# Patient Record
Sex: FEMALE | Race: OTHER | HISPANIC OR LATINO | URBAN - METROPOLITAN AREA
[De-identification: names, ages, dates, MRNs, and addresses within clinical notes are randomized per-mention and may not be internally consistent; named-entity substitution may affect disease eponyms.]

---

## 2022-06-01 ENCOUNTER — EMERGENCY (EMERGENCY)
Facility: HOSPITAL | Age: 37
LOS: 1 days | Discharge: HOME | End: 2022-06-01
Attending: EMERGENCY MEDICINE | Admitting: EMERGENCY MEDICINE
Payer: MEDICAID

## 2022-06-01 VITALS
HEART RATE: 89 BPM | RESPIRATION RATE: 18 BRPM | TEMPERATURE: 97 F | SYSTOLIC BLOOD PRESSURE: 113 MMHG | DIASTOLIC BLOOD PRESSURE: 71 MMHG | OXYGEN SATURATION: 100 %

## 2022-06-01 DIAGNOSIS — Z98.891 HISTORY OF UTERINE SCAR FROM PREVIOUS SURGERY: Chronic | ICD-10-CM

## 2022-06-01 LAB
ALBUMIN SERPL ELPH-MCNC: 4.1 G/DL — SIGNIFICANT CHANGE UP (ref 3.3–5)
ALP SERPL-CCNC: 67 U/L — SIGNIFICANT CHANGE UP (ref 40–120)
ALT FLD-CCNC: 40 U/L — HIGH (ref 4–33)
ANION GAP SERPL CALC-SCNC: 12 MMOL/L — SIGNIFICANT CHANGE UP (ref 7–14)
AST SERPL-CCNC: 29 U/L — SIGNIFICANT CHANGE UP (ref 4–32)
B PERT DNA SPEC QL NAA+PROBE: SIGNIFICANT CHANGE UP
B PERT+PARAPERT DNA PNL SPEC NAA+PROBE: SIGNIFICANT CHANGE UP
BASOPHILS # BLD AUTO: 0.02 K/UL — SIGNIFICANT CHANGE UP (ref 0–0.2)
BASOPHILS NFR BLD AUTO: 0.2 % — SIGNIFICANT CHANGE UP (ref 0–2)
BILIRUB SERPL-MCNC: 0.4 MG/DL — SIGNIFICANT CHANGE UP (ref 0.2–1.2)
BLOOD GAS VENOUS COMPREHENSIVE RESULT: SIGNIFICANT CHANGE UP
BORDETELLA PARAPERTUSSIS (RAPRVP): SIGNIFICANT CHANGE UP
BUN SERPL-MCNC: 4 MG/DL — LOW (ref 7–23)
C PNEUM DNA SPEC QL NAA+PROBE: SIGNIFICANT CHANGE UP
CALCIUM SERPL-MCNC: 9.6 MG/DL — SIGNIFICANT CHANGE UP (ref 8.4–10.5)
CHLORIDE SERPL-SCNC: 97 MMOL/L — LOW (ref 98–107)
CO2 SERPL-SCNC: 23 MMOL/L — SIGNIFICANT CHANGE UP (ref 22–31)
CREAT SERPL-MCNC: 0.42 MG/DL — LOW (ref 0.5–1.3)
EGFR: 132 ML/MIN/1.73M2 — SIGNIFICANT CHANGE UP
EOSINOPHIL # BLD AUTO: 0.12 K/UL — SIGNIFICANT CHANGE UP (ref 0–0.5)
EOSINOPHIL NFR BLD AUTO: 1.4 % — SIGNIFICANT CHANGE UP (ref 0–6)
FLUAV SUBTYP SPEC NAA+PROBE: SIGNIFICANT CHANGE UP
FLUBV RNA SPEC QL NAA+PROBE: SIGNIFICANT CHANGE UP
GLUCOSE SERPL-MCNC: 368 MG/DL — HIGH (ref 70–99)
HADV DNA SPEC QL NAA+PROBE: SIGNIFICANT CHANGE UP
HCOV 229E RNA SPEC QL NAA+PROBE: SIGNIFICANT CHANGE UP
HCOV HKU1 RNA SPEC QL NAA+PROBE: SIGNIFICANT CHANGE UP
HCOV NL63 RNA SPEC QL NAA+PROBE: SIGNIFICANT CHANGE UP
HCOV OC43 RNA SPEC QL NAA+PROBE: SIGNIFICANT CHANGE UP
HCT VFR BLD CALC: 38.9 % — SIGNIFICANT CHANGE UP (ref 34.5–45)
HGB BLD-MCNC: 12.9 G/DL — SIGNIFICANT CHANGE UP (ref 11.5–15.5)
HMPV RNA SPEC QL NAA+PROBE: SIGNIFICANT CHANGE UP
HPIV1 RNA SPEC QL NAA+PROBE: SIGNIFICANT CHANGE UP
HPIV2 RNA SPEC QL NAA+PROBE: SIGNIFICANT CHANGE UP
HPIV3 RNA SPEC QL NAA+PROBE: SIGNIFICANT CHANGE UP
HPIV4 RNA SPEC QL NAA+PROBE: SIGNIFICANT CHANGE UP
IANC: 5.91 K/UL — SIGNIFICANT CHANGE UP (ref 1.8–7.4)
IMM GRANULOCYTES NFR BLD AUTO: 0.2 % — SIGNIFICANT CHANGE UP (ref 0–1.5)
LYMPHOCYTES # BLD AUTO: 2.27 K/UL — SIGNIFICANT CHANGE UP (ref 1–3.3)
LYMPHOCYTES # BLD AUTO: 25.7 % — SIGNIFICANT CHANGE UP (ref 13–44)
M PNEUMO DNA SPEC QL NAA+PROBE: SIGNIFICANT CHANGE UP
MCHC RBC-ENTMCNC: 30 PG — SIGNIFICANT CHANGE UP (ref 27–34)
MCHC RBC-ENTMCNC: 33.2 GM/DL — SIGNIFICANT CHANGE UP (ref 32–36)
MCV RBC AUTO: 90.5 FL — SIGNIFICANT CHANGE UP (ref 80–100)
MONOCYTES # BLD AUTO: 0.48 K/UL — SIGNIFICANT CHANGE UP (ref 0–0.9)
MONOCYTES NFR BLD AUTO: 5.4 % — SIGNIFICANT CHANGE UP (ref 2–14)
NEUTROPHILS # BLD AUTO: 5.91 K/UL — SIGNIFICANT CHANGE UP (ref 1.8–7.4)
NEUTROPHILS NFR BLD AUTO: 67.1 % — SIGNIFICANT CHANGE UP (ref 43–77)
NRBC # BLD: 0 /100 WBCS — SIGNIFICANT CHANGE UP
NRBC # FLD: 0 K/UL — SIGNIFICANT CHANGE UP
PLATELET # BLD AUTO: 304 K/UL — SIGNIFICANT CHANGE UP (ref 150–400)
POTASSIUM SERPL-MCNC: 4.4 MMOL/L — SIGNIFICANT CHANGE UP (ref 3.5–5.3)
POTASSIUM SERPL-SCNC: 4.4 MMOL/L — SIGNIFICANT CHANGE UP (ref 3.5–5.3)
PROT SERPL-MCNC: 7.8 G/DL — SIGNIFICANT CHANGE UP (ref 6–8.3)
RAPID RVP RESULT: DETECTED
RBC # BLD: 4.3 M/UL — SIGNIFICANT CHANGE UP (ref 3.8–5.2)
RBC # FLD: 12.4 % — SIGNIFICANT CHANGE UP (ref 10.3–14.5)
RSV RNA SPEC QL NAA+PROBE: SIGNIFICANT CHANGE UP
RV+EV RNA SPEC QL NAA+PROBE: DETECTED
SARS-COV-2 RNA SPEC QL NAA+PROBE: SIGNIFICANT CHANGE UP
SODIUM SERPL-SCNC: 132 MMOL/L — LOW (ref 135–145)
WBC # BLD: 8.82 K/UL — SIGNIFICANT CHANGE UP (ref 3.8–10.5)
WBC # FLD AUTO: 8.82 K/UL — SIGNIFICANT CHANGE UP (ref 3.8–10.5)

## 2022-06-01 PROCEDURE — 99220: CPT

## 2022-06-01 PROCEDURE — 70481 CT ORBIT/EAR/FOSSA W/DYE: CPT | Mod: 26,MA

## 2022-06-01 RX ORDER — INSULIN LISPRO 100/ML
8 VIAL (ML) SUBCUTANEOUS
Refills: 0 | Status: DISCONTINUED | OUTPATIENT
Start: 2022-06-01 | End: 2022-06-01

## 2022-06-01 RX ORDER — DEXTROSE 50 % IN WATER 50 %
25 SYRINGE (ML) INTRAVENOUS ONCE
Refills: 0 | Status: DISCONTINUED | OUTPATIENT
Start: 2022-06-01 | End: 2022-06-04

## 2022-06-01 RX ORDER — INSULIN LISPRO 100/ML
VIAL (ML) SUBCUTANEOUS
Refills: 0 | Status: DISCONTINUED | OUTPATIENT
Start: 2022-06-01 | End: 2022-06-04

## 2022-06-01 RX ORDER — DEXTROSE 50 % IN WATER 50 %
15 SYRINGE (ML) INTRAVENOUS ONCE
Refills: 0 | Status: DISCONTINUED | OUTPATIENT
Start: 2022-06-01 | End: 2022-06-04

## 2022-06-01 RX ORDER — SODIUM CHLORIDE 9 MG/ML
1000 INJECTION, SOLUTION INTRAVENOUS
Refills: 0 | Status: DISCONTINUED | OUTPATIENT
Start: 2022-06-01 | End: 2022-06-04

## 2022-06-01 RX ORDER — INSULIN LISPRO 100/ML
5 VIAL (ML) SUBCUTANEOUS
Refills: 0 | Status: DISCONTINUED | OUTPATIENT
Start: 2022-06-01 | End: 2022-06-04

## 2022-06-01 RX ORDER — KETOROLAC TROMETHAMINE 30 MG/ML
30 SYRINGE (ML) INJECTION ONCE
Refills: 0 | Status: DISCONTINUED | OUTPATIENT
Start: 2022-06-01 | End: 2022-06-01

## 2022-06-01 RX ORDER — CIPROFLOXACIN AND DEXAMETHASONE 3; 1 MG/ML; MG/ML
4 SUSPENSION/ DROPS AURICULAR (OTIC) ONCE
Refills: 0 | Status: DISCONTINUED | OUTPATIENT
Start: 2022-06-01 | End: 2022-06-01

## 2022-06-01 RX ORDER — INSULIN LISPRO 100/ML
VIAL (ML) SUBCUTANEOUS AT BEDTIME
Refills: 0 | Status: DISCONTINUED | OUTPATIENT
Start: 2022-06-01 | End: 2022-06-04

## 2022-06-01 RX ORDER — GLUCAGON INJECTION, SOLUTION 0.5 MG/.1ML
1 INJECTION, SOLUTION SUBCUTANEOUS ONCE
Refills: 0 | Status: DISCONTINUED | OUTPATIENT
Start: 2022-06-01 | End: 2022-06-04

## 2022-06-01 RX ORDER — DEXTROSE 50 % IN WATER 50 %
12.5 SYRINGE (ML) INTRAVENOUS ONCE
Refills: 0 | Status: DISCONTINUED | OUTPATIENT
Start: 2022-06-01 | End: 2022-06-04

## 2022-06-01 RX ORDER — KETOROLAC TROMETHAMINE 30 MG/ML
15 SYRINGE (ML) INJECTION EVERY 6 HOURS
Refills: 0 | Status: DISCONTINUED | OUTPATIENT
Start: 2022-06-01 | End: 2022-06-01

## 2022-06-01 RX ORDER — CIPROFLOXACIN HCL 0.3 %
4 DROPS OPHTHALMIC (EYE)
Refills: 0 | Status: DISCONTINUED | OUTPATIENT
Start: 2022-06-01 | End: 2022-06-04

## 2022-06-01 RX ORDER — SODIUM CHLORIDE 9 MG/ML
1000 INJECTION INTRAMUSCULAR; INTRAVENOUS; SUBCUTANEOUS ONCE
Refills: 0 | Status: COMPLETED | OUTPATIENT
Start: 2022-06-01 | End: 2022-06-01

## 2022-06-01 RX ORDER — CIPROFLOXACIN HCL 0.3 %
4 DROPS OPHTHALMIC (EYE) ONCE
Refills: 0 | Status: DISCONTINUED | OUTPATIENT
Start: 2022-06-01 | End: 2022-06-01

## 2022-06-01 RX ORDER — INSULIN GLARGINE 100 [IU]/ML
14 INJECTION, SOLUTION SUBCUTANEOUS AT BEDTIME
Refills: 0 | Status: DISCONTINUED | OUTPATIENT
Start: 2022-06-01 | End: 2022-06-04

## 2022-06-01 RX ORDER — CIPROFLOXACIN HCL 0.3 %
4 DROPS OPHTHALMIC (EYE)
Refills: 0 | Status: DISCONTINUED | OUTPATIENT
Start: 2022-06-01 | End: 2022-06-01

## 2022-06-01 RX ADMIN — Medication 15 MILLIGRAM(S): at 22:45

## 2022-06-01 RX ADMIN — SODIUM CHLORIDE 1000 MILLILITER(S): 9 INJECTION INTRAMUSCULAR; INTRAVENOUS; SUBCUTANEOUS at 16:26

## 2022-06-01 RX ADMIN — Medication 4 DROP(S): at 22:27

## 2022-06-01 RX ADMIN — Medication 30 MILLIGRAM(S): at 12:36

## 2022-06-01 RX ADMIN — Medication 15 MILLIGRAM(S): at 22:26

## 2022-06-01 RX ADMIN — Medication 1 TABLET(S): at 20:59

## 2022-06-01 RX ADMIN — Medication 2: at 18:22

## 2022-06-01 RX ADMIN — INSULIN GLARGINE 14 UNIT(S): 100 INJECTION, SOLUTION SUBCUTANEOUS at 22:51

## 2022-06-01 NOTE — ED PROVIDER NOTE - NS ED ATTENDING STATEMENT MOD
This was a shared visit with the BASIL. I reviewed and verified the documentation and independently performed the documented:

## 2022-06-01 NOTE — ED PROVIDER NOTE - OBJECTIVE STATEMENT
37 y/o Telugu speaking F hx type 2 DM here c/o R ear pain x 5 days. Hx obtained using  ID number [id number]. St 37 y/o French speaking F hx type 2 DM here c/o R ear pain x 5 days. Hx obtained using  ID number [id number]. States she went to an outside hospital and was given Amoxicillin, which she has been taking w/o any relief. +diminished hearing in the R ear. Denies fevers, chills, or any other complaints.

## 2022-06-01 NOTE — ED ADULT NURSE NOTE - OBJECTIVE STATEMENT
Pt received to intake AAO x3 c/o ear pain, started 5 days ago right ear pain and jaw pain c/o pain when biting down or eating food. Pt recently seen and dc'd on antibiotics. 18G placed to the left AC and pt medicated for pain.

## 2022-06-01 NOTE — ED PROVIDER NOTE - CLINICAL SUMMARY MEDICAL DECISION MAKING FREE TEXT BOX
37 y/o Nicaraguan speaking F hx type 2 DM here c/o R ear pain x 5 days. Exam concerning for mastoid tenderness. Plan CT IAC, pain control, reassess

## 2022-06-01 NOTE — ED CDU PROVIDER INITIAL DAY NOTE - ATTENDING APP SHARED VISIT CONTRIBUTION OF CARE
Otitis media/externa w/ suspicion for mastoiditis, hyperglycemia-plan abx, pain control, glucose control, ent/endocrine c/s

## 2022-06-01 NOTE — ED ADULT NURSE NOTE - CHIEF COMPLAINT QUOTE
was seen at [facility] d/cd with ABX and pt states" I don't feel better"

## 2022-06-01 NOTE — ED PROVIDER NOTE - CARE PLAN
Principal Discharge DX:	Right ear pain  Secondary Diagnosis:	Hyperglycemia due to diabetes mellitus   1

## 2022-06-01 NOTE — ED PROVIDER NOTE - ATTENDING APP SHARED VISIT CONTRIBUTION OF CARE
36 year old with right ear pain.  concern for otitis externa vs mastoiditis. drainage from ear, suspect tm rupture. abx, ct head ent cx, [facility] cdu for obs

## 2022-06-01 NOTE — ED CDU PROVIDER INITIAL DAY NOTE - OBJECTIVE STATEMENT
37 y/o Greek speaking F hx type 2 DM here c/o R ear pain x 5 days. Hx obtained using  ID number [id number]. States she went to an outside hospital and was given Amoxicillin, which she has been taking w/o any relief. +diminished hearing in the R ear. Denies fevers, chills, or any other complaints.    CDU NOTE: CT IAC done in the main ED w/ acute otitis media and externa, also concerning for possible mastoiditis. ENT c/s called, pending recs. Pt sent to CDU for iv abx and glucose control as glucose is >300. Pt only on Metformin. Was previously on a long-acting insulin 14Uqhs, however was told to discontinue in 11/2021. Endocrine emailed for c/s.

## 2022-06-01 NOTE — CONSULT NOTE ADULT - SUBJECTIVE AND OBJECTIVE BOX
HPI:  Patient is a 36y Female with PMH significant for *** who p/w ***.       Physical Exam  T(C): 36.8 (06-01-22 @ 17:25), Max: 36.8 (06-01-22 @ 13:25)  HR: 84 (06-01-22 @ 17:25) (84 - 89)  BP: 115/80 (06-01-22 @ 17:25) (110/73 - 115/80)  RR: 17 (06-01-22 @ 17:25) (16 - 18)  SpO2: 100% (06-01-22 @ 17:25) (99% - 100%)  General: NAD, A+Ox3  Resp: No respiratory distress, stridor, or stertor  Voice quality: normal  Face:  Symmetric without masses or lesions  OU: EOMI  AD: Pinna wnl, EAC clear, TM intact, no effusion  AS: Pinna wnl, EAC clear, TM intact, no effusion  Nose: nasal cavity clear bilaterally, inferior turbinates normal, mucosa normal without crusting or bleeding  OC/OP: tongue normal, floor of mouth wnl, no masses or lesions, OP clear  Neck: soft/flat, no LAD  CNII-XII intact    **Laryngoscopy Procedure    A/P: 36y Female      --------------------------------------------------------------  Thank you for the consult,    Hetal Byers MD  Resident  Department of Otolaryngology - Head and Neck Surgery  Peds Page #[phone]  Adult Page #[phone]  --------------------------------------------------------------- HPI:  Patient is a 36y Female with PMH significant for type 2 DM here c/o R ear pain x 5 days. Hx obtained using . States she went to an outside hospital and was given Amoxicillin, which she has been taking w/o any relief. +diminished hearing in the R ear. Reports drainage that began about 3 days ago. Denies fevers, chills, or any other complaints. No vertigo or dizziness.     Physical Exam  T(C): 36.8 (06-01-22 @ 17:25), Max: 36.8 (06-01-22 @ 13:25)  HR: 84 (06-01-22 @ 17:25) (84 - 89)  BP: 115/80 (06-01-22 @ 17:25) (110/73 - 115/80)  RR: 17 (06-01-22 @ 17:25) (16 - 18)  SpO2: 100% (06-01-22 @ 17:25) (99% - 100%)  General: NAD, A+Ox3  Resp: No respiratory distress, stridor, or stertor  Voice quality: normal  Face:  Symmetric without masses or lesions  OU: EOMI  AD: Pinna with dried pus, EAC edematous with pus, unable to visualize TM. Wick placed.   AS: Pinna wnl, EAC clear, TM intact with mild tympanosclerosis, no effusion  Nose: nasal cavity clear bilaterally, inferior turbinates normal, mucosa normal without crusting or bleeding  OC/OP: tongue normal, floor of mouth wnl, no masses or lesions, OP clear  Neck: soft/flat, no LAD  CNII-XII intact    c< from: CT Internal Auditory Canals w/ IV Cont (06.01.22 @ 14:20) >  ACC: [id number] EXAM:  CT IAC IC                          PROCEDURE DATE:  06/01/2022          INTERPRETATION:  .    CLINICAL INFORMATION: Right-sided ear pain and jaw pain. Complaining of   pain when biting down or eating food. Evaluate for mastoiditis.    COMPARISON: None available.    TECHNIQUE: High resolution CT imaging of the temporal bones was   performed. 63 cc's of IV Omnipaque-350  was administered without   immediate complication and 7 cc's was discarded. Sagittal and coronal   reformatted images were obtained from the source data and also reviewed.    FINDINGS:    On the right, soft tissue thickening and hyperenhancement involves the   cartilaginous portion of the right external auditory canal. No bony   erosive changes are seen. Surrounding inflammatory changes and soft   tissue thickening is seen involving the periauricular soft tissues   extending to the right retromastoid soft tissues as well.    There is thickening and mild retraction of the tympanic membrane.   Nonspecific abnormal soft tissue is also seen within the middle ear   cavity at the level of the epitympanum surrounding the ossicular chain   and also within Prussak's space. No gross bony erosive changes are   notable. The scutum appears sharp. Nonspecific soft tissue also fills the   hypotympanic recesses. The aditus and mastoid antrum are grossly clear.   There is nonspecific partial opacification of the right mastoid air cells   without mastoid wall destruction. The internal auditory canal is normal   in caliber. The facial nerve has a normal course. The inner ear   structures are normally formed. The cochlea, vestibule, and semicircular   canals appear unremarkable. The vestibular aqueduct appears within normal   limits. The otic capsule appears intact. The bony coverings of the   vascular structures are intact. There is a borderline lateralized right   internal auditory canal. There is a borderline high riding right-sided   jugular bulb.    On the left, the external auditory canal is normal appearance. The   tympanic membrane is not thickened. The middle ear cavity is   well-aerated. The ossicular chain is intact. The otic capsule appears   within normal limits. The facial nerve has a normal course. The inner ear   structures are normally formed. The internal auditory canal is normal in   size. Mastoid air cells are well-aerated. No bony erosion or destruction   is seen. The bony covering of the vascular structures remain intact. The   left internal carotid canal is borderline lateralized. There is a   borderline high riding left-sided jugular bulb.    The visualized portion of the brain is unremarkable.    Scattered mucosal thickening and foamy aerated secretions are seen   throughout the paranasal sinuses.    IMPRESSION:    1. Right-sided otitis externa along with right-sided otitis media.    2. Additional imaging findings for which right-sided mastoiditis can be   considered. No mastoid destruction at this time.    3. Borderline bilateral high riding jugular bulbs.    4. Additional imaging findings for which acute sinusitis can be   considered. Clinical correlation is required for this diagnosis.    --- End of Report ---    < end of copied text >      A/P: 36y Female PMH DM2 p/w R OE and possible OM, unable to visualize TM.   -Wick to stay in 3-4d, can be removed outpt  -ciprodex to R ear  -can switch to augmentin     --------------------------------------------------------------  Thank you for the consult,    Hetal Byers MD  Resident  Department of Otolaryngology - Head and Neck Surgery  Peds Page #[phone]  Adult Page #[phone]  ---------------------------------------------------------------

## 2022-06-01 NOTE — ED ADULT NURSE REASSESSMENT NOTE - NEURO BEHAVIOR
----- Message from Tamiko Gannon sent at 6/4/2020  2:21 PM CDT -----  Contact: Self  Type: Patient Call Back    Who called: Laura    What is the request in detail:Patient called to request an alternative for linaGLIPtin (TRADJENTA) 5 mg for a cheaper price. Please call to advise    Can the clinic reply by [facility]?    Would the patient rather a call back or a response via [facility]? Call      Best call back number:[phone]      
Inform patient we can place her on Amaryl 2 mg 1 tablet daily.  This medication has common side effects of hypoglycemia and possible weight gain  Please verify if she would like to consider a trial of this medication  
Patient picked up partial script for $77. Attempt made to contact patient's reached out to [facility] was on hold for 14 mins.   
Patient would like to proceed with the trial of Amaryl.   
Rxn sent  
calm

## 2022-06-01 NOTE — ED PROVIDER NOTE - PROGRESS NOTE DETAILS
RAMAN Mijares: CT IAC w/ acute otitis media and externa, also concerning for possible mastoiditis. ENT c/s called, pending recs for IV abx. Will put pt in CDU for iv abx and glucose control as glucose is >300. Pt only on Metformin. Was previously on a long-acting insulin 14Uqhs, however was told to discontinue in 11/2021. Endocrine emailed for c/s.

## 2022-06-02 VITALS
HEART RATE: 98 BPM | SYSTOLIC BLOOD PRESSURE: 124 MMHG | DIASTOLIC BLOOD PRESSURE: 73 MMHG | TEMPERATURE: 98 F | OXYGEN SATURATION: 97 % | RESPIRATION RATE: 18 BRPM

## 2022-06-02 DIAGNOSIS — E11.65 TYPE 2 DIABETES MELLITUS WITH HYPERGLYCEMIA: ICD-10-CM

## 2022-06-02 DIAGNOSIS — H60.501 UNSPECIFIED ACUTE NONINFECTIVE OTITIS EXTERNA, RIGHT EAR: ICD-10-CM

## 2022-06-02 DIAGNOSIS — H66.91 OTITIS MEDIA, UNSPECIFIED, RIGHT EAR: ICD-10-CM

## 2022-06-02 PROBLEM — Z00.00 ENCOUNTER FOR PREVENTIVE HEALTH EXAMINATION: Status: ACTIVE | Noted: 2022-06-02

## 2022-06-02 PROCEDURE — 99217: CPT

## 2022-06-02 PROCEDURE — 99234 HOSP IP/OBS SM DT SF/LOW 45: CPT

## 2022-06-02 PROCEDURE — 99204 OFFICE O/P NEW MOD 45 MIN: CPT

## 2022-06-02 RX ORDER — ENOXAPARIN SODIUM 100 MG/ML
14 INJECTION SUBCUTANEOUS
Qty: 15 | Refills: 0
Start: 2022-06-02 | End: 2022-07-31

## 2022-06-02 RX ORDER — ISOPROPYL ALCOHOL, BENZOCAINE .7; .06 ML/ML; ML/ML
1 SWAB TOPICAL
Qty: 100 | Refills: 1
Start: 2022-06-02 | End: 2022-07-21

## 2022-06-02 RX ORDER — METFORMIN HYDROCHLORIDE 850 MG/1
1 TABLET ORAL
Qty: 120 | Refills: 0
Start: 2022-06-02 | End: 2022-07-31

## 2022-06-02 RX ORDER — CIPROFLOXACIN AND DEXAMETHASONE 3; 1 MG/ML; MG/ML
4 SUSPENSION/ DROPS AURICULAR (OTIC)
Qty: 1 | Refills: 0
Start: 2022-06-02 | End: 2022-06-08

## 2022-06-02 RX ADMIN — Medication 1 TABLET(S): at 06:57

## 2022-06-02 RX ADMIN — Medication 5 UNIT(S): at 07:50

## 2022-06-02 RX ADMIN — Medication 5 UNIT(S): at 11:52

## 2022-06-02 RX ADMIN — Medication 2: at 07:50

## 2022-06-02 RX ADMIN — Medication 3: at 11:51

## 2022-06-02 RX ADMIN — Medication 4 DROP(S): at 06:57

## 2022-06-02 NOTE — PROGRESS NOTE ADULT - ASSESSMENT
36 years old Female with a PMH of DM2 p/w R OE and possible OM, unable to visualize TM due to wick in the ear canal secondary to external canal swelling. Patient reports improvement on antibiotic.

## 2022-06-02 NOTE — ED CDU PROVIDER DISPOSITION NOTE - CLINICAL COURSE
37 y/o Ukrainian speaking F hx type 2 DM here c/o R ear pain x 5 days.   CT IAC done in the main ED w/ acute otitis media and externa, also concerning for possible mastoiditis. ENT c/s called, pending recs. Pt sent to CDU for abx and glucose control as glucose is >300. Pt only on Metformin. Was previously on a long-acting insulin 14Uqhs, however was told to discontinue in 11/2021. Endocrine emailed for c/s. ENT recs appreciated. Seen by ENT and Endo, recs appreciated. Pt stable for dc home. Reassessed via intercepter number [id number] (Jairo), pt reports feeling better. All discharge instructions including medication and dosage discussed with pt, as well as follow up care. She verbalizes full understanding.

## 2022-06-02 NOTE — ED CDU PROVIDER DISPOSITION NOTE - ATTENDING CONTRIBUTION TO CARE
remi: pt here with severe ear pain, being seen by ent, wick placed and will be d/c on antibiotics and pain meds.  pt to f/u with ent outpt.  pt awake, alert and understands the plan.    I performed a history and physical exam of the patient and discussed their management with the resident and /or advanced care provider. I reviewed the resident and /or ACP's note and agree with the documented findings and plan of care. My medical decison making and observations are found above.

## 2022-06-02 NOTE — PROGRESS NOTE ADULT - PROBLEM SELECTOR PLAN 2
- Please discharge patient home with augmentin 875-125mg BID for 10 days  - Please have patient llow up in ENT clinic on Monday  - Please call [phone] for an appointment  - Case discussed with Dr. Malone

## 2022-06-02 NOTE — ED CDU PROVIDER SUBSEQUENT DAY NOTE - PROGRESS NOTE DETAILS
Seen by ENT and Endo, recs appreciated. Pt stable for dc home. Reassessed via intercepter number [id number] (Jairo), pt reports feeling better. All discharge instructions including medication and dosage discussed with pt, as well as follow up care. She verbalizes full understanding.

## 2022-06-02 NOTE — ED CDU PROVIDER SUBSEQUENT DAY NOTE - CPE EDP HEAD NORM PED
[FreeTextEntry1] : labs from 3/8/22\par HbA1c 5\par total chol 182\par HDL 44\par \par Trig 62\par TSH normal\par CBC normal\par CMP normal\par Vit D 50
Head atraumatic, normal cephalic shape.

## 2022-06-02 NOTE — PROGRESS NOTE ADULT - PROBLEM SELECTOR PLAN 1
- Please discharge patient home with ciprodex 5 drops in the right ear 2 times a day for 10 days  - NSAIDs for pain  - Cotton ball with petroleum jelly when bathing  - Please keep otowick in the patient for few days, please have patient follow up in ENT clinic on Monday for right otowick removal  - Please call [phone] for an appointment  - Case discussed with Dr. Malone

## 2022-06-02 NOTE — ED CDU PROVIDER DISPOSITION NOTE - PATIENT PORTAL LINK FT
You can access the FollowMyHealth Patient Portal offered by [facility] by registering at the following website: [email or web address]. By joining [facility]’s FollowMyHealth portal, you will also be able to view your health information using other applications (apps) compatible with our system.

## 2022-06-02 NOTE — ED CDU PROVIDER SUBSEQUENT DAY NOTE - HISTORY
35 y/o Welsh speaking F hx type 2 DM here c/o R ear pain x 5 days.   CT IAC done in the main ED w/ acute otitis media and externa, also concerning for possible mastoiditis. ENT c/s called, pending recs. Pt sent to CDU for abx and glucose control as glucose is >300. Pt only on Metformin. Was previously on a long-acting insulin 14Uqhs, however was told to discontinue in 11/2021. Endocrine emailed for c/s.  ENT recs appreciated. Endocrine to officially consult this morning.

## 2022-06-02 NOTE — CONSULT NOTE ADULT - ASSESSMENT
36F uncontrolled DM2 HbA1c 10.3% in CDU with R ear pain, rule out mastoiditis.    1) DM2  -body weight not typical for DM2 but no DKA on admission. Mother and grandmother with DM on oral agents. Consider rule out BIRDIE with genetic testin as outpatient.  -In CDU receiving Lantus 14 units qhs, Admelog 5 units premeal TID, low correction scales.  -Discussed with  assistance that DM is uncontrolled and need for improved glycemic control.  Will resume basal insulin pen (Lantus or equivalent) at prior dose of 14 units qhs, in addition to continuing home metformin 1000mg BID.  Prescribe BD bairon pen needles  Patient has glucometer at home.  She reports knowing how to use pen.  Carb consistent diet.  Counselled to avoid pregnancy given uncontrolled DM status.  She will follow up outpatient with prior endocrinologist in [place].  Plan reviewed with CDU team.    Nina Rhodes MD  Division of Endocrinology  Pager: [phone]    If after 6PM or before 9AM, or on weekends/holidays, please call endocrine answering service for assistance ([phone]).  For nonurgent matters email [email or web address] for assistance.

## 2022-06-02 NOTE — ED CDU PROVIDER SUBSEQUENT DAY NOTE - ATTENDING APP SHARED VISIT CONTRIBUTION OF CARE
remi: pt is here with concern for mastoiditis.  pt also dm, with a1c over 10.  seen by ent and had mri to eval for mastoiditis,.  pt with otitis media and externa, but not mastoidits, will be dc with abx po and ear drops, wick was placed in affected ear.  awaiting input from endocrine for elevated glucose/ a1c.    I performed a history and physical exam of the patient and discussed their management with the resident and /or advanced care provider. I reviewed the resident and /or ACP's note and agree with the documented findings and plan of care. My medical decison making and observations are found above.

## 2022-06-02 NOTE — PROGRESS NOTE ADULT - SUBJECTIVE AND OBJECTIVE BOX
ENT ISSUE/POD: Right otitis media and otitis externa    HPI: Patient seen and examed at bedside, reports the ear pain is improved with antibiotics, no more drainage from ear. No changes in hearing. Denies tinnitus, vertigo, fever, chills and dizziness.         PAST MEDICAL & SURGICAL HISTORY:  Diabetes      H/O:         Allergies    No Known Allergies    Intolerances      MEDICATIONS  (STANDING):  amoxicillin  875 milliGRAM(s)/clavulanate 1 Tablet(s) Oral two times a day  ciprofloxacin  0.3% Otic Solution 4 Drop(s) Right Ear two times a day  dexAMETHasone 0.1% Ophthalmic Solution for OTIC Use 4 Drop(s) Right Ear two times a day  dextrose 5%. 1000 milliLiter(s) (100 mL/Hr) IV Continuous <Continuous>  dextrose 5%. 1000 milliLiter(s) (50 mL/Hr) IV Continuous <Continuous>  dextrose 50% Injectable 25 Gram(s) IV Push once  dextrose 50% Injectable 12.5 Gram(s) IV Push once  dextrose 50% Injectable 25 Gram(s) IV Push once  glucagon  Injectable 1 milliGRAM(s) IntraMuscular once  insulin glargine Injectable (LANTUS) 14 Unit(s) SubCutaneous at bedtime  insulin lispro (ADMELOG) corrective regimen sliding scale   SubCutaneous three times a day before meals  insulin lispro (ADMELOG) corrective regimen sliding scale   SubCutaneous at bedtime  insulin lispro Injectable (ADMELOG) 5 Unit(s) SubCutaneous three times a day with meals    MEDICATIONS  (PRN):  dextrose Oral Gel 15 Gram(s) Oral once PRN Blood Glucose LESS THAN 70 milliGRAM(s)/deciliter  ketorolac   Injectable 15 milliGRAM(s) IV Push every 6 hours PRN Moderate Pain (4 - 6)      Social History: see consult note    Family history: see consult note    ROS:   ENT: all negative except as noted in HPI   Pulm: denies SOB, cough, hemoptysis  Neuro: denies numbness/tingling, loss of sensation  Endo: denies heat/cold intolerance, excessive sweating      Vital Signs Last 24 Hrs  T(C): 36.7 (2022 11:09), Max: 36.9 (2022 06:24)  T(F): 98 (2022 11:09), Max: 98.4 (2022 06:24)  HR: 85 (2022 11:09) (84 - 89)  BP: 110/76 (2022 11:09) (103/69 - 115/80)  BP(mean): --  RR: 17 (2022 11:09) (15 - 17)  SpO2: 99% (2022 11:09) (99% - 100%)                          12.9   8.82  )-----------( 304      ( 2022 12:10 )             38.9    06-01    132<L>  |  97<L>  |  4<L>  ----------------------------<  368<H>  4.4   |  23  |  0.42<L>    Ca    9.6      2022 12:10    TPro  7.8  /  Alb  4.1  /  TBili  0.4  /  DBili  x   /  AST  29  /  ALT  40<H>  /  AlkPhos  67  06-01       PHYSICAL EXAM:  Gen: NAD  Skin: No rashes, bruises, or lesions  Head: Normocephalic, Atraumatic  Face: no edema, erythema, or fluctuance. Parotid glands soft without mass  Eyes: no scleral injection  Ears: Right - pinna with dried drainage, cleaned with wet guaze, no active drainage, unable to see TM due to ear wick in the external ear canal. Mild mastoid tenderness with palpation, no erythema,and no ear bulging            Left - ear canal clear, TM intact with mild tympanosclerosis without effusion or erythema. No evidence of any fluid drainage. No mastoid tenderness, erythema, or ear bulging  Nose: Nares bilaterally patent, no discharge  Mouth: No Stridor / Drooling / Trismus.  Mucosa moist, tongue/uvula midline, oropharynx clear  Neck: Flat, supple, no lymphadenopathy, trachea midline, no masses  Lymphatic: No lymphadenopathy  Resp: breathing easily, no stridor  Neuro: facial nerve intact, no facial droop

## 2022-06-02 NOTE — ED CDU PROVIDER DISPOSITION NOTE - NSFOLLOWUPINSTRUCTIONS_ED_ALL_ED_FT
Please take the following medications as prescribed:    For your ear infection:  - Augmentin 1 tablet twice a day.   - Ciprodex 4 drops to affected ear twice a day.  - Ibuprofen 600mg every 6 hours for pain control.    For your diabetes:   - Lantus 14U at bedtime.  - Metformin 1000mg bid.     Please see referral attached for follow up with ENT (ear specialist) on Monday 6/6.    You should also follow up with Endocrinology. See referral attached.    Follow up with your PMD within 48-72 hrs. Show copies of your reports given to you. Take all of your medications as previously prescribed.    If you have any new, worsened or concerning symptoms, please return to the emergency department immediately.

## 2022-06-02 NOTE — CONSULT NOTE ADULT - SUBJECTIVE AND OBJECTIVE BOX
HPI: 37 y/o [place]ish speaking F hx type 2 DM here c/o R ear pain x 5 days. Hx obtained using  ID number [id number]. States she went to an outside hospital and was given Amoxicillin, which she has been taking w/o any relief. +diminished hearing in the R ear. Denies fevers, chills, or any other complaints.    Endocrine history:  # [id number]  36F with DM2 x 8 years. Presents with R ear pain, rule out mastoiditis.  Home Dm med only metformin 1000mg BID.  Reports sometimes watching diet at home, but denies drinking juice, soda and not much rice or other starches.  Has a glucometer at home usually 200s.  No hypoglycemia. Was on basal insulin (14 units at night) in the past, stopped by a diabetes specialist in [place] 2021. Also sees a PCP. Mother and grandmother have DM and use pills.  + polyuria, polydipsia. Occasional blurry vision, reports saw ophtho and no retinopathy. Denies neuropathy symptoms.      PAST MEDICAL & SURGICAL HISTORY:  Diabetes    H/O:     FAMILY HISTORY:  No pertinent family history in first degree relatives    Social History: noncontributary    Home meds:  metFORMIN 1000 mg oral tablet: 1 tab(s) orally 2 times a day       MEDICATIONS  (STANDING):  amoxicillin  875 milliGRAM(s)/clavulanate 1 Tablet(s) Oral two times a day  ciprofloxacin  0.3% Otic Solution 4 Drop(s) Right Ear two times a day  dexAMETHasone 0.1% Ophthalmic Solution for OTIC Use 4 Drop(s) Right Ear two times a day  dextrose 5%. 1000 milliLiter(s) (100 mL/Hr) IV Continuous <Continuous>  dextrose 5%. 1000 milliLiter(s) (50 mL/Hr) IV Continuous <Continuous>  dextrose 50% Injectable 25 Gram(s) IV Push once  dextrose 50% Injectable 12.5 Gram(s) IV Push once  dextrose 50% Injectable 25 Gram(s) IV Push once  glucagon  Injectable 1 milliGRAM(s) IntraMuscular once  insulin glargine Injectable (LANTUS) 14 Unit(s) SubCutaneous at bedtime  insulin lispro (ADMELOG) corrective regimen sliding scale   SubCutaneous three times a day before meals  insulin lispro (ADMELOG) corrective regimen sliding scale   SubCutaneous at bedtime  insulin lispro Injectable (ADMELOG) 5 Unit(s) SubCutaneous three times a day with meals    MEDICATIONS  (PRN):  dextrose Oral Gel 15 Gram(s) Oral once PRN Blood Glucose LESS THAN 70 milliGRAM(s)/deciliter  ketorolac   Injectable 15 milliGRAM(s) IV Push every 6 hours PRN Moderate Pain (4 - 6)      Allergies    No Known Allergies    Intolerances      Review of Systems:  Constitutional: No fever  Eyes: No blurry vision  Neuro: No tremors  HEENT: R ear pain  Cardiovascular: No chest pain, palpitations  Respiratory: No SOB, no cough  GI: No nausea, vomiting, abdominal pain  : No dysuria  Skin: no rash  Psych: no depression  Endocrine: no polyuria, polydipsia  Hem/lymph: no swelling  Osteoporosis: no fractures    ALL OTHER SYSTEMS REVIEWED AND NEGATIVE      PHYSICAL EXAM:  VITALS: T(C): 36.7 (22 @ 11:09)  T(F): 98 (22 @ 11:09), Max: 98.4 (22 @ 06:24)  HR: 85 (22 @ 11:09) (84 - 89)  BP: 110/76 (22 @ 11:09) (103/69 - 115/80)  RR:  (15 - 17)  SpO2:  (99% - 100%)  Wt(kg): --  GENERAL: NAD, well-groomed, well-developed  EYES: No proptosis, no lid lag, anicteric  HEENT:  Atraumatic, Normocephalic, moist mucous membranes  THYROID: Normal size, no palpable nodules  RESPIRATORY: Clear to auscultation bilaterally; No rales, rhonchi, wheezing  CARDIOVASCULAR: Regular rate and rhythm; No murmurs; no peripheral edema  GI: Soft, nontender, non distended, normal bowel sounds  SKIN: Dry, intact, No rashes or lesions  MUSCULOSKELETAL: Full range of motion, normal strength  NEURO: sensation intact, extraocular movements intact, no tremor  PSYCH: Alert and oriented x 3, normal affect, normal mood      CAPILLARY BLOOD GLUCOSE      POCT Blood Glucose.: 265 mg/dL (2022 11:32)  POCT Blood Glucose.: 211 mg/dL (2022 07:29)  POCT Blood Glucose.: 244 mg/dL (2022 21:58)  POCT Blood Glucose.: 202 mg/dL (2022 18:01)                            12.9   8.82  )-----------( 304      ( 2022 12:10 )             38.9       06    132<L>  |  97<L>  |  4<L>  ----------------------------<  368<H>  4.4   |  23  |  0.42<L>    eGFR: 132    Ca    9.6          TPro  7.8  /  Alb  4.1  /  TBili  0.4  /  DBili  x   /  AST  29  /  ALT  40<H>  /  AlkPhos  67        Thyroid Function Tests:      A1C with Estimated Average Glucose Result: 10.3 % (22 @ 12:10)          Radiology:

## 2022-06-03 PROBLEM — E11.9 TYPE 2 DIABETES MELLITUS WITHOUT COMPLICATIONS: Chronic | Status: ACTIVE | Noted: 2022-06-01

## 2022-06-16 ENCOUNTER — APPOINTMENT (OUTPATIENT)
Dept: OTOLARYNGOLOGY | Facility: CLINIC | Age: 37
End: 2022-06-16

## 2022-06-27 ENCOUNTER — APPOINTMENT (OUTPATIENT)
Dept: OTOLARYNGOLOGY | Facility: CLINIC | Age: 37
End: 2022-06-27

## 2023-11-22 ENCOUNTER — EMERGENCY (EMERGENCY)
Facility: HOSPITAL | Age: 38
LOS: 1 days | Discharge: HOME | End: 2023-11-22
Attending: STUDENT IN AN ORGANIZED HEALTH CARE EDUCATION/TRAINING PROGRAM | Admitting: STUDENT IN AN ORGANIZED HEALTH CARE EDUCATION/TRAINING PROGRAM
Payer: MEDICAID

## 2023-11-22 VITALS
HEART RATE: 88 BPM | RESPIRATION RATE: 16 BRPM | TEMPERATURE: 98 F | OXYGEN SATURATION: 100 % | DIASTOLIC BLOOD PRESSURE: 80 MMHG | SYSTOLIC BLOOD PRESSURE: 114 MMHG

## 2023-11-22 VITALS
OXYGEN SATURATION: 100 % | TEMPERATURE: 98 F | DIASTOLIC BLOOD PRESSURE: 73 MMHG | RESPIRATION RATE: 18 BRPM | SYSTOLIC BLOOD PRESSURE: 117 MMHG | HEART RATE: 72 BPM

## 2023-11-22 DIAGNOSIS — Z98.891 HISTORY OF UTERINE SCAR FROM PREVIOUS SURGERY: Chronic | ICD-10-CM

## 2023-11-22 LAB
ALBUMIN SERPL ELPH-MCNC: 4.6 G/DL — SIGNIFICANT CHANGE UP (ref 3.3–5)
ALBUMIN SERPL ELPH-MCNC: 4.6 G/DL — SIGNIFICANT CHANGE UP (ref 3.3–5)
ALP SERPL-CCNC: 53 U/L — SIGNIFICANT CHANGE UP (ref 40–120)
ALP SERPL-CCNC: 53 U/L — SIGNIFICANT CHANGE UP (ref 40–120)
ALT FLD-CCNC: 25 U/L — SIGNIFICANT CHANGE UP (ref 4–33)
ALT FLD-CCNC: 25 U/L — SIGNIFICANT CHANGE UP (ref 4–33)
ANION GAP SERPL CALC-SCNC: 9 MMOL/L — SIGNIFICANT CHANGE UP (ref 7–14)
ANION GAP SERPL CALC-SCNC: 9 MMOL/L — SIGNIFICANT CHANGE UP (ref 7–14)
AST SERPL-CCNC: 17 U/L — SIGNIFICANT CHANGE UP (ref 4–32)
AST SERPL-CCNC: 17 U/L — SIGNIFICANT CHANGE UP (ref 4–32)
BASOPHILS # BLD AUTO: 0.03 K/UL — SIGNIFICANT CHANGE UP (ref 0–0.2)
BASOPHILS # BLD AUTO: 0.03 K/UL — SIGNIFICANT CHANGE UP (ref 0–0.2)
BASOPHILS NFR BLD AUTO: 0.4 % — SIGNIFICANT CHANGE UP (ref 0–2)
BASOPHILS NFR BLD AUTO: 0.4 % — SIGNIFICANT CHANGE UP (ref 0–2)
BILIRUB SERPL-MCNC: 0.7 MG/DL — SIGNIFICANT CHANGE UP (ref 0.2–1.2)
BILIRUB SERPL-MCNC: 0.7 MG/DL — SIGNIFICANT CHANGE UP (ref 0.2–1.2)
BUN SERPL-MCNC: 11 MG/DL — SIGNIFICANT CHANGE UP (ref 7–23)
BUN SERPL-MCNC: 11 MG/DL — SIGNIFICANT CHANGE UP (ref 7–23)
CALCIUM SERPL-MCNC: 9.8 MG/DL — SIGNIFICANT CHANGE UP (ref 8.4–10.5)
CALCIUM SERPL-MCNC: 9.8 MG/DL — SIGNIFICANT CHANGE UP (ref 8.4–10.5)
CHLORIDE SERPL-SCNC: 100 MMOL/L — SIGNIFICANT CHANGE UP (ref 98–107)
CHLORIDE SERPL-SCNC: 100 MMOL/L — SIGNIFICANT CHANGE UP (ref 98–107)
CO2 SERPL-SCNC: 24 MMOL/L — SIGNIFICANT CHANGE UP (ref 22–31)
CO2 SERPL-SCNC: 24 MMOL/L — SIGNIFICANT CHANGE UP (ref 22–31)
CREAT SERPL-MCNC: 0.49 MG/DL — LOW (ref 0.5–1.3)
CREAT SERPL-MCNC: 0.49 MG/DL — LOW (ref 0.5–1.3)
EGFR: 126 ML/MIN/1.73M2 — SIGNIFICANT CHANGE UP
EGFR: 126 ML/MIN/1.73M2 — SIGNIFICANT CHANGE UP
EOSINOPHIL # BLD AUTO: 0.17 K/UL — SIGNIFICANT CHANGE UP (ref 0–0.5)
EOSINOPHIL # BLD AUTO: 0.17 K/UL — SIGNIFICANT CHANGE UP (ref 0–0.5)
EOSINOPHIL NFR BLD AUTO: 2.5 % — SIGNIFICANT CHANGE UP (ref 0–6)
EOSINOPHIL NFR BLD AUTO: 2.5 % — SIGNIFICANT CHANGE UP (ref 0–6)
GLUCOSE SERPL-MCNC: 250 MG/DL — HIGH (ref 70–99)
GLUCOSE SERPL-MCNC: 250 MG/DL — HIGH (ref 70–99)
HCT VFR BLD CALC: 40.3 % — SIGNIFICANT CHANGE UP (ref 34.5–45)
HCT VFR BLD CALC: 40.3 % — SIGNIFICANT CHANGE UP (ref 34.5–45)
HGB BLD-MCNC: 13.4 G/DL — SIGNIFICANT CHANGE UP (ref 11.5–15.5)
HGB BLD-MCNC: 13.4 G/DL — SIGNIFICANT CHANGE UP (ref 11.5–15.5)
IANC: 3.34 K/UL — SIGNIFICANT CHANGE UP (ref 1.8–7.4)
IANC: 3.34 K/UL — SIGNIFICANT CHANGE UP (ref 1.8–7.4)
IMM GRANULOCYTES NFR BLD AUTO: 0.1 % — SIGNIFICANT CHANGE UP (ref 0–0.9)
IMM GRANULOCYTES NFR BLD AUTO: 0.1 % — SIGNIFICANT CHANGE UP (ref 0–0.9)
LYMPHOCYTES # BLD AUTO: 2.86 K/UL — SIGNIFICANT CHANGE UP (ref 1–3.3)
LYMPHOCYTES # BLD AUTO: 2.86 K/UL — SIGNIFICANT CHANGE UP (ref 1–3.3)
LYMPHOCYTES # BLD AUTO: 41.9 % — SIGNIFICANT CHANGE UP (ref 13–44)
LYMPHOCYTES # BLD AUTO: 41.9 % — SIGNIFICANT CHANGE UP (ref 13–44)
MAGNESIUM SERPL-MCNC: 1.8 MG/DL — SIGNIFICANT CHANGE UP (ref 1.6–2.6)
MAGNESIUM SERPL-MCNC: 1.8 MG/DL — SIGNIFICANT CHANGE UP (ref 1.6–2.6)
MCHC RBC-ENTMCNC: 30.3 PG — SIGNIFICANT CHANGE UP (ref 27–34)
MCHC RBC-ENTMCNC: 30.3 PG — SIGNIFICANT CHANGE UP (ref 27–34)
MCHC RBC-ENTMCNC: 33.3 GM/DL — SIGNIFICANT CHANGE UP (ref 32–36)
MCHC RBC-ENTMCNC: 33.3 GM/DL — SIGNIFICANT CHANGE UP (ref 32–36)
MCV RBC AUTO: 91.2 FL — SIGNIFICANT CHANGE UP (ref 80–100)
MCV RBC AUTO: 91.2 FL — SIGNIFICANT CHANGE UP (ref 80–100)
MONOCYTES # BLD AUTO: 0.41 K/UL — SIGNIFICANT CHANGE UP (ref 0–0.9)
MONOCYTES # BLD AUTO: 0.41 K/UL — SIGNIFICANT CHANGE UP (ref 0–0.9)
MONOCYTES NFR BLD AUTO: 6 % — SIGNIFICANT CHANGE UP (ref 2–14)
MONOCYTES NFR BLD AUTO: 6 % — SIGNIFICANT CHANGE UP (ref 2–14)
NEUTROPHILS # BLD AUTO: 3.34 K/UL — SIGNIFICANT CHANGE UP (ref 1.8–7.4)
NEUTROPHILS # BLD AUTO: 3.34 K/UL — SIGNIFICANT CHANGE UP (ref 1.8–7.4)
NEUTROPHILS NFR BLD AUTO: 49.1 % — SIGNIFICANT CHANGE UP (ref 43–77)
NEUTROPHILS NFR BLD AUTO: 49.1 % — SIGNIFICANT CHANGE UP (ref 43–77)
NRBC # BLD: 0 /100 WBCS — SIGNIFICANT CHANGE UP (ref 0–0)
NRBC # BLD: 0 /100 WBCS — SIGNIFICANT CHANGE UP (ref 0–0)
NRBC # FLD: 0 K/UL — SIGNIFICANT CHANGE UP (ref 0–0)
NRBC # FLD: 0 K/UL — SIGNIFICANT CHANGE UP (ref 0–0)
PHOSPHATE SERPL-MCNC: 2.6 MG/DL — SIGNIFICANT CHANGE UP (ref 2.5–4.5)
PHOSPHATE SERPL-MCNC: 2.6 MG/DL — SIGNIFICANT CHANGE UP (ref 2.5–4.5)
PLATELET # BLD AUTO: 249 K/UL — SIGNIFICANT CHANGE UP (ref 150–400)
PLATELET # BLD AUTO: 249 K/UL — SIGNIFICANT CHANGE UP (ref 150–400)
POTASSIUM SERPL-MCNC: 4.7 MMOL/L — SIGNIFICANT CHANGE UP (ref 3.5–5.3)
POTASSIUM SERPL-MCNC: 4.7 MMOL/L — SIGNIFICANT CHANGE UP (ref 3.5–5.3)
POTASSIUM SERPL-SCNC: 4.7 MMOL/L — SIGNIFICANT CHANGE UP (ref 3.5–5.3)
POTASSIUM SERPL-SCNC: 4.7 MMOL/L — SIGNIFICANT CHANGE UP (ref 3.5–5.3)
PROT SERPL-MCNC: 7.5 G/DL — SIGNIFICANT CHANGE UP (ref 6–8.3)
PROT SERPL-MCNC: 7.5 G/DL — SIGNIFICANT CHANGE UP (ref 6–8.3)
RBC # BLD: 4.42 M/UL — SIGNIFICANT CHANGE UP (ref 3.8–5.2)
RBC # BLD: 4.42 M/UL — SIGNIFICANT CHANGE UP (ref 3.8–5.2)
RBC # FLD: 12 % — SIGNIFICANT CHANGE UP (ref 10.3–14.5)
RBC # FLD: 12 % — SIGNIFICANT CHANGE UP (ref 10.3–14.5)
SODIUM SERPL-SCNC: 133 MMOL/L — LOW (ref 135–145)
SODIUM SERPL-SCNC: 133 MMOL/L — LOW (ref 135–145)
WBC # BLD: 6.82 K/UL — SIGNIFICANT CHANGE UP (ref 3.8–10.5)
WBC # BLD: 6.82 K/UL — SIGNIFICANT CHANGE UP (ref 3.8–10.5)
WBC # FLD AUTO: 6.82 K/UL — SIGNIFICANT CHANGE UP (ref 3.8–10.5)
WBC # FLD AUTO: 6.82 K/UL — SIGNIFICANT CHANGE UP (ref 3.8–10.5)

## 2023-11-22 PROCEDURE — 99285 EMERGENCY DEPT VISIT HI MDM: CPT

## 2023-11-22 PROCEDURE — 70496 CT ANGIOGRAPHY HEAD: CPT | Mod: 26,MA,76

## 2023-11-22 RX ORDER — LORATADINE 10 MG/1
10 TABLET ORAL ONCE
Refills: 0 | Status: DISCONTINUED | OUTPATIENT
Start: 2023-11-22 | End: 2023-11-22

## 2023-11-22 RX ORDER — KETOROLAC TROMETHAMINE 30 MG/ML
30 SYRINGE (ML) INJECTION ONCE
Refills: 0 | Status: DISCONTINUED | OUTPATIENT
Start: 2023-11-22 | End: 2023-11-22

## 2023-11-22 RX ORDER — DIPHENHYDRAMINE HCL 50 MG
50 CAPSULE ORAL ONCE
Refills: 0 | Status: COMPLETED | OUTPATIENT
Start: 2023-11-22 | End: 2023-11-22

## 2023-11-22 RX ORDER — SODIUM CHLORIDE 9 MG/ML
1000 INJECTION INTRAMUSCULAR; INTRAVENOUS; SUBCUTANEOUS ONCE
Refills: 0 | Status: COMPLETED | OUTPATIENT
Start: 2023-11-22 | End: 2023-11-22

## 2023-11-22 RX ORDER — MAGNESIUM SULFATE 500 MG/ML
1 VIAL (ML) INJECTION ONCE
Refills: 0 | Status: COMPLETED | OUTPATIENT
Start: 2023-11-22 | End: 2023-11-22

## 2023-11-22 RX ORDER — METOCLOPRAMIDE HCL 10 MG
10 TABLET ORAL ONCE
Refills: 0 | Status: COMPLETED | OUTPATIENT
Start: 2023-11-22 | End: 2023-11-22

## 2023-11-22 RX ADMIN — SODIUM CHLORIDE 1000 MILLILITER(S): 9 INJECTION INTRAMUSCULAR; INTRAVENOUS; SUBCUTANEOUS at 12:10

## 2023-11-22 RX ADMIN — Medication 30 MILLIGRAM(S): at 10:48

## 2023-11-22 RX ADMIN — Medication 100 GRAM(S): at 10:47

## 2023-11-22 RX ADMIN — Medication 50 MILLIGRAM(S): at 10:47

## 2023-11-22 RX ADMIN — Medication 10 MILLIGRAM(S): at 10:47

## 2023-11-22 RX ADMIN — SODIUM CHLORIDE 1000 MILLILITER(S): 9 INJECTION INTRAMUSCULAR; INTRAVENOUS; SUBCUTANEOUS at 10:47

## 2023-11-22 NOTE — ED PROVIDER NOTE - CLINICAL SUMMARY MEDICAL DECISION MAKING FREE TEXT BOX
38 yo F on OCPs, presenting with complaints of L sided headache x 2 weeks. Taking Tylenol that only mildly helps symptoms. Took tylenol this morning, pain now 7/10. Denies fevers/chills, cough, visual changes, nausea/vomiting. No falls/trauma. Seen at OSH, reports negative CT. Has outpatient neurology appointment scheduled in a few months. Exam significant for sinus ttp. ddx: migraine, CVT, low suspicion for SAH- denies sudden onset, sinusitis, idiopathic intracranial hypertension. Plan for labs, meds, fluids, CTA/CTV and reassess. 38 yo F diabetes, on OCPs, presenting with complaints of L sided headache x 2 weeks. Taking Tylenol that only mildly helps symptoms. Took tylenol this morning, pain now 7/10. Denies fevers/chills, cough, visual changes, nausea/vomiting. No falls/trauma. Seen at OSH, reports negative CT. Has outpatient neurology appointment scheduled in a few months. Exam significant for sinus ttp. ddx: migraine, CVT, low suspicion for SAH- denies sudden onset, sinusitis, idiopathic intracranial hypertension. Plan for labs, meds, fluids, CTA/CTV and reassess. 36 yo F diabetes, on OCPs, presenting with complaints of L sided headache x 2 weeks. Taking Tylenol that only mildly helps symptoms. Took tylenol this morning, pain now 7/10. Denies fevers/chills, cough, visual changes, nausea/vomiting. No falls/trauma. Seen at OSH, reports negative CT. Has outpatient neurology appointment scheduled in a few months. Exam significant for sinus ttp. ddx: migraine, CVT, low suspicion for SAH- denies sudden onset, sinusitis, idiopathic intracranial hypertension. Plan for labs, meds, fluids, CTA/CTV and reassess. Consider LP if negative work up and persistent HA.

## 2023-11-22 NOTE — ED ADULT NURSE NOTE - OBJECTIVE STATEMENT
Patient came in with the complaints of Headache. As per patient, she have headache since 3 weeks and she had CT scan in [facility] with negative findings. No other complaints. No distress noted. Specimens collected and sent. Medications given as ordered. patient tolerated well. Nursing care continues

## 2023-11-22 NOTE — ED PROVIDER NOTE - PROGRESS NOTE DETAILS
Allen: pt still with headache s/p meds. Pending CT. Neuro consulted Fellow MD Fernando Shen: Neurology recommends outpatient neurology follow up. Allen: patient with improved symptoms. Feels better and wants to go home. Outpatient neuro follow up and return recs

## 2023-11-22 NOTE — ED ADULT NURSE NOTE - CHIEF COMPLAINT QUOTE
pt coming with HA X 2 wks, denies N/V pt stated was seen by [facility]. had a CTH =neg was ref. to neurologist chelly Louie. DM2

## 2023-11-22 NOTE — ED ADULT NURSE NOTE - NSFALLUNIVINTERV_ED_ALL_ED
Bed/Stretcher in lowest position, wheels locked, appropriate side rails in place/Call bell, personal items and telephone in reach/Instruct patient to call for assistance before getting out of bed/chair/stretcher/Non-slip footwear applied when patient is off stretcher/[place] to call system/Physically safe environment - no spills, clutter or unnecessary equipment/Purposeful proactive rounding/Room/bathroom lighting operational, light cord in reach

## 2023-11-22 NOTE — ED PROVIDER NOTE - PATIENT PORTAL LINK FT
You can access the FollowMyHealth Patient Portal offered by [facility] by registering at the following website: [email or web address]. By joining [facility]’s FollowMyHealth portal, you will also be able to view your health information using other applications (apps) compatible with our system.

## 2023-11-22 NOTE — ED PROVIDER NOTE - NSFOLLOWUPINSTRUCTIONS_ED_ALL_ED_FT
Take Tylenol 650mg (Two 325 mg pills) every 4-6 hours as needed for pain.  Take Motrin 600 mg every 8 hours as needed for moderate pain -- take with food.    Call to schedule follow up with neurology. Asya Emmanuel MD  Neurology, Headache Medicine  [phone]    You were seen and evaluated in the emergency department for a headache. Your exam, workup and imaging were reassuring. You were given treatment which improved your symptoms. We have recommended a course of treatment that should improve your headache. Please follow up with your regular provider in the next 2-3 days. Please read the attached information sheets which provide useful information pertinent to your condition.    It is important to understand that no medical workup can completely exclude all concerning conditions. Therefore, we ask that you please return for worsening or concerning new symptoms including but not limited to changes to your vision, sensory changes such as numbness, motor weakness (loss of strength or inability to properly use parts of your body), dizziness or loss of coordination, loss of consciousness, significant worsening of the pain, or other worsening or concerning new symptoms.    Please follow up with your regular providers within the next 2-3 days.

## 2023-11-22 NOTE — ED PROVIDER NOTE - PHYSICAL EXAMINATION
VITALS: reviewed  GEN: NAD, A & O x 4  HEAD/EYES: NCAT, PERRL, EOMI, anicteric sclerae, no conjunctival pallor. L frontal and maxillary sinus ttp.   ENT: mucus membranes moist, oropharynx WNL, trachea midline, no JVD. TM pearly gray b/l.   RESP: lungs CTA with equal breath sounds bilaterally, chest wall nontender and atraumatic  CV: heart with reg rhythm S1, S2, no murmur; distal pulses intact and symmetric bilaterally  ABDOMEN: normoactive bowel sounds, soft, nondistended, nontender, no palpable masses  : no CVAT  MSK: extremities atraumatic and nontender, no edema, no asymmetry.   SKIN: warm, dry, no rash, no bruising, no cyanosis. color appropriate for ethnicity  NEURO: alert, mentating appropriately, no facial asymmetry. gross sensation, motor, coordination are intact  PSYCH: Affect appropriate